# Patient Record
(demographics unavailable — no encounter records)

---

## 2024-12-31 NOTE — PHYSICAL EXAM
[Chaperone Present] : A chaperone was present in the examining room during all aspects of the physical examination [[id number]] : A chaperone was present during the pelvic exam. [FreeTextEntry2] : J Luis [Appropriately responsive] : appropriately responsive [Alert] : alert [No Acute Distress] : no acute distress [No Lymphadenopathy] : no lymphadenopathy [Regular Rate Rhythm] : regular rate rhythm [No Murmurs] : no murmurs [Clear to Auscultation B/L] : clear to auscultation bilaterally [Soft] : soft [Non-tender] : non-tender [Non-distended] : non-distended [No HSM] : No HSM [No Lesions] : no lesions [No Mass] : no mass [Oriented x3] : oriented x3 [Examination Of The Breasts] : a normal appearance [Breast Palpation Diffuse Fibrous Tissue Bilateral] : fibrocystic changes [No Masses] : no breast masses were palpable [Labia Majora] : normal [Labia Minora] : normal [Normal] : normal [Tenderness] : nontender [Enlarged ___ wks] : enlarged [unfilled] ~Uweeks [Mass ___ cm] : no uterine mass was palpated [Uterine Adnexae] : normal

## 2024-12-31 NOTE — HISTORY OF PRESENT ILLNESS
[Frequency: Q ___ days] : menstrual periods occur approximately every [unfilled] days [Menstrual Cramps] : menstrual cramps [Currently Active] : currently active [Men] : men [No] : No [FreeTextEntry1] : 10/17/2024

## 2024-12-31 NOTE — PLAN
[FreeTextEntry1] : DIETARY CHANGES, EXERCISE MODIFICATIONS, PNV W/ DHA RECOMMENDED, ZIKA/TRAVEL RESTRICTIONS, VACCINES DISCUSSED PRENATAL LABS URINE CULTURE VAGINAL CULTURES COLLECTED REFERRAL FOR MFM US CHANG

## 2024-12-31 NOTE — PHYSICAL EXAM
[Chaperone Present] : A chaperone was present in the examining room during all aspects of the physical examination [[id number]] : A chaperone was present during the pelvic exam. [FreeTextEntry2] : J Luis [Appropriately responsive] : appropriately responsive [Alert] : alert [No Acute Distress] : no acute distress [No Lymphadenopathy] : no lymphadenopathy [Regular Rate Rhythm] : regular rate rhythm [No Murmurs] : no murmurs [Clear to Auscultation B/L] : clear to auscultation bilaterally [Soft] : soft [Non-tender] : non-tender [Non-distended] : non-distended [No HSM] : No HSM [No Lesions] : no lesions [No Mass] : no mass [Oriented x3] : oriented x3 [Examination Of The Breasts] : a normal appearance [Breast Palpation Diffuse Fibrous Tissue Bilateral] : fibrocystic changes [No Masses] : no breast masses were palpable [Labia Majora] : normal [Labia Minora] : normal [Normal] : normal [Tenderness] : nontender [Enlarged ___ wks] : enlarged [unfilled] ~Uweeks [Mass ___ cm] : no uterine mass was palpated [Uterine Adnexae] : normal